# Patient Record
Sex: MALE | Race: WHITE | Employment: FULL TIME | URBAN - METROPOLITAN AREA
[De-identification: names, ages, dates, MRNs, and addresses within clinical notes are randomized per-mention and may not be internally consistent; named-entity substitution may affect disease eponyms.]

---

## 2017-07-07 ENCOUNTER — OFFICE VISIT (OUTPATIENT)
Dept: FAMILY MEDICINE CLINIC | Age: 52
End: 2017-07-07

## 2017-07-07 VITALS
HEIGHT: 76 IN | SYSTOLIC BLOOD PRESSURE: 142 MMHG | DIASTOLIC BLOOD PRESSURE: 88 MMHG | BODY MASS INDEX: 36.36 KG/M2 | HEART RATE: 72 BPM | WEIGHT: 298.6 LBS | RESPIRATION RATE: 18 BRPM

## 2017-07-07 DIAGNOSIS — Z12.5 PROSTATE CANCER SCREENING: ICD-10-CM

## 2017-07-07 DIAGNOSIS — Z12.11 COLON CANCER SCREENING: ICD-10-CM

## 2017-07-07 DIAGNOSIS — Z00.00 ANNUAL PHYSICAL EXAM: Primary | ICD-10-CM

## 2017-07-07 PROCEDURE — 3017F COLORECTAL CA SCREEN DOC REV: CPT | Performed by: FAMILY MEDICINE

## 2017-07-07 PROCEDURE — G8427 DOCREV CUR MEDS BY ELIG CLIN: HCPCS | Performed by: FAMILY MEDICINE

## 2017-07-07 PROCEDURE — 1036F TOBACCO NON-USER: CPT | Performed by: FAMILY MEDICINE

## 2017-07-07 PROCEDURE — 99396 PREV VISIT EST AGE 40-64: CPT | Performed by: FAMILY MEDICINE

## 2017-07-07 PROCEDURE — G8417 CALC BMI ABV UP PARAM F/U: HCPCS | Performed by: FAMILY MEDICINE

## 2017-07-07 RX ORDER — MOMETASONE FUROATE 1 MG/G
CREAM TOPICAL
Qty: 30 G | Refills: 1 | Status: SHIPPED | OUTPATIENT
Start: 2017-07-07

## 2017-07-07 ASSESSMENT — PATIENT HEALTH QUESTIONNAIRE - PHQ9
1. LITTLE INTEREST OR PLEASURE IN DOING THINGS: 0
SUM OF ALL RESPONSES TO PHQ9 QUESTIONS 1 & 2: 0
SUM OF ALL RESPONSES TO PHQ QUESTIONS 1-9: 0
2. FEELING DOWN, DEPRESSED OR HOPELESS: 0

## 2017-07-08 DIAGNOSIS — Z00.00 ANNUAL PHYSICAL EXAM: ICD-10-CM

## 2017-07-08 DIAGNOSIS — Z12.5 PROSTATE CANCER SCREENING: ICD-10-CM

## 2017-07-08 LAB
ALBUMIN SERPL-MCNC: 4.2 G/DL (ref 3.9–4.9)
ALP BLD-CCNC: 43 U/L (ref 35–104)
ALT SERPL-CCNC: 24 U/L (ref 0–41)
ANION GAP SERPL CALCULATED.3IONS-SCNC: 12 MEQ/L (ref 7–13)
AST SERPL-CCNC: 26 U/L (ref 0–40)
BASOPHILS ABSOLUTE: 0.1 K/UL (ref 0–0.2)
BASOPHILS RELATIVE PERCENT: 1.1 %
BILIRUB SERPL-MCNC: 0.8 MG/DL (ref 0–1.2)
BUN BLDV-MCNC: 14 MG/DL (ref 6–20)
CALCIUM SERPL-MCNC: 8.8 MG/DL (ref 8.6–10.2)
CHLORIDE BLD-SCNC: 104 MEQ/L (ref 98–107)
CHOLESTEROL, TOTAL: 154 MG/DL (ref 0–199)
CO2: 22 MEQ/L (ref 22–29)
CREAT SERPL-MCNC: 1.06 MG/DL (ref 0.7–1.2)
EOSINOPHILS ABSOLUTE: 0.1 K/UL (ref 0–0.7)
EOSINOPHILS RELATIVE PERCENT: 1.8 %
GFR AFRICAN AMERICAN: >60
GFR NON-AFRICAN AMERICAN: >60
GLOBULIN: 2.3 G/DL (ref 2.3–3.5)
GLUCOSE BLD-MCNC: 89 MG/DL (ref 74–109)
HBA1C MFR BLD: 4.5 % (ref 4.8–5.9)
HCT VFR BLD CALC: 44.3 % (ref 42–52)
HDLC SERPL-MCNC: 52 MG/DL (ref 40–59)
HEMOGLOBIN: 15.5 G/DL (ref 14–18)
LDL CHOLESTEROL CALCULATED: 84 MG/DL (ref 0–129)
LYMPHOCYTES ABSOLUTE: 1.4 K/UL (ref 1–4.8)
LYMPHOCYTES RELATIVE PERCENT: 30.1 %
MCH RBC QN AUTO: 30.1 PG (ref 27–31.3)
MCHC RBC AUTO-ENTMCNC: 35.1 % (ref 33–37)
MCV RBC AUTO: 85.8 FL (ref 80–100)
MONOCYTES ABSOLUTE: 0.4 K/UL (ref 0.2–0.8)
MONOCYTES RELATIVE PERCENT: 9.1 %
NEUTROPHILS ABSOLUTE: 2.7 K/UL (ref 1.4–6.5)
NEUTROPHILS RELATIVE PERCENT: 57.9 %
PDW BLD-RTO: 14.2 % (ref 11.5–14.5)
PLATELET # BLD: 129 K/UL (ref 130–400)
POTASSIUM SERPL-SCNC: 4.1 MEQ/L (ref 3.5–5.1)
PROSTATE SPECIFIC ANTIGEN: 0.53 NG/ML (ref 0–3.89)
RBC # BLD: 5.16 M/UL (ref 4.7–6.1)
SODIUM BLD-SCNC: 138 MEQ/L (ref 132–144)
TOTAL PROTEIN: 6.5 G/DL (ref 6.4–8.1)
TRIGL SERPL-MCNC: 89 MG/DL (ref 0–200)
WBC # BLD: 4.7 K/UL (ref 4.8–10.8)

## 2017-08-04 ENCOUNTER — ANESTHESIA (OUTPATIENT)
Dept: ENDOSCOPY | Age: 52
End: 2017-08-04
Payer: COMMERCIAL

## 2017-08-04 ENCOUNTER — ANESTHESIA EVENT (OUTPATIENT)
Dept: ENDOSCOPY | Age: 52
End: 2017-08-04
Payer: COMMERCIAL

## 2017-08-04 ENCOUNTER — HOSPITAL ENCOUNTER (OUTPATIENT)
Age: 52
Setting detail: OUTPATIENT SURGERY
Discharge: HOME | End: 2017-08-04
Attending: SPECIALIST | Admitting: SPECIALIST
Payer: COMMERCIAL

## 2017-08-04 VITALS
BODY MASS INDEX: 34.7 KG/M2 | TEMPERATURE: 97.4 F | SYSTOLIC BLOOD PRESSURE: 98 MMHG | WEIGHT: 285 LBS | HEIGHT: 76 IN | HEART RATE: 52 BPM | OXYGEN SATURATION: 97 % | RESPIRATION RATE: 18 BRPM | DIASTOLIC BLOOD PRESSURE: 64 MMHG

## 2017-08-04 VITALS — DIASTOLIC BLOOD PRESSURE: 52 MMHG | SYSTOLIC BLOOD PRESSURE: 110 MMHG | OXYGEN SATURATION: 96 %

## 2017-08-04 PROCEDURE — 3609027000 HC COLONOSCOPY: Performed by: SPECIALIST

## 2017-08-04 PROCEDURE — 3700000001 HC ADD 15 MINUTES (ANESTHESIA): Performed by: SPECIALIST

## 2017-08-04 PROCEDURE — 6360000002 HC RX W HCPCS: Performed by: NURSE ANESTHETIST, CERTIFIED REGISTERED

## 2017-08-04 PROCEDURE — 2580000003 HC RX 258: Performed by: SPECIALIST

## 2017-08-04 PROCEDURE — 7100000011 HC PHASE II RECOVERY - ADDTL 15 MIN: Performed by: SPECIALIST

## 2017-08-04 PROCEDURE — 88305 TISSUE EXAM BY PATHOLOGIST: CPT

## 2017-08-04 PROCEDURE — 2500000003 HC RX 250 WO HCPCS: Performed by: NURSE ANESTHETIST, CERTIFIED REGISTERED

## 2017-08-04 PROCEDURE — 3700000000 HC ANESTHESIA ATTENDED CARE: Performed by: SPECIALIST

## 2017-08-04 PROCEDURE — 7100000010 HC PHASE II RECOVERY - FIRST 15 MIN: Performed by: SPECIALIST

## 2017-08-04 RX ORDER — SODIUM CHLORIDE 0.9 % (FLUSH) 0.9 %
10 SYRINGE (ML) INJECTION EVERY 12 HOURS SCHEDULED
Status: DISCONTINUED | OUTPATIENT
Start: 2017-08-04 | End: 2017-08-04 | Stop reason: HOSPADM

## 2017-08-04 RX ORDER — SODIUM CHLORIDE 0.9 % (FLUSH) 0.9 %
10 SYRINGE (ML) INJECTION PRN
Status: DISCONTINUED | OUTPATIENT
Start: 2017-08-04 | End: 2017-08-04 | Stop reason: HOSPADM

## 2017-08-04 RX ORDER — ONDANSETRON 2 MG/ML
4 INJECTION INTRAMUSCULAR; INTRAVENOUS
Status: DISCONTINUED | OUTPATIENT
Start: 2017-08-04 | End: 2017-08-04 | Stop reason: HOSPADM

## 2017-08-04 RX ORDER — SODIUM CHLORIDE 9 MG/ML
INJECTION, SOLUTION INTRAVENOUS CONTINUOUS
Status: DISCONTINUED | OUTPATIENT
Start: 2017-08-04 | End: 2017-08-04 | Stop reason: HOSPADM

## 2017-08-04 RX ORDER — LIDOCAINE HYDROCHLORIDE 10 MG/ML
INJECTION, SOLUTION INFILTRATION; PERINEURAL PRN
Status: DISCONTINUED | OUTPATIENT
Start: 2017-08-04 | End: 2017-08-04 | Stop reason: SDUPTHER

## 2017-08-04 RX ORDER — PROPOFOL 10 MG/ML
INJECTION, EMULSION INTRAVENOUS PRN
Status: DISCONTINUED | OUTPATIENT
Start: 2017-08-04 | End: 2017-08-04 | Stop reason: SDUPTHER

## 2017-08-04 RX ORDER — LIDOCAINE HYDROCHLORIDE 10 MG/ML
1 INJECTION, SOLUTION EPIDURAL; INFILTRATION; INTRACAUDAL; PERINEURAL
Status: DISCONTINUED | OUTPATIENT
Start: 2017-08-04 | End: 2017-08-04 | Stop reason: HOSPADM

## 2017-08-04 RX ADMIN — PROPOFOL 50 MG: 10 INJECTION, EMULSION INTRAVENOUS at 09:39

## 2017-08-04 RX ADMIN — PROPOFOL 30 MG: 10 INJECTION, EMULSION INTRAVENOUS at 09:51

## 2017-08-04 RX ADMIN — PROPOFOL 40 MG: 10 INJECTION, EMULSION INTRAVENOUS at 09:43

## 2017-08-04 RX ADMIN — PROPOFOL 30 MG: 10 INJECTION, EMULSION INTRAVENOUS at 09:53

## 2017-08-04 RX ADMIN — PROPOFOL 50 MG: 10 INJECTION, EMULSION INTRAVENOUS at 09:37

## 2017-08-04 RX ADMIN — PROPOFOL 40 MG: 10 INJECTION, EMULSION INTRAVENOUS at 09:45

## 2017-08-04 RX ADMIN — PROPOFOL 50 MG: 10 INJECTION, EMULSION INTRAVENOUS at 09:55

## 2017-08-04 RX ADMIN — SODIUM CHLORIDE: 9 INJECTION, SOLUTION INTRAVENOUS at 09:30

## 2017-08-04 RX ADMIN — PROPOFOL 30 MG: 10 INJECTION, EMULSION INTRAVENOUS at 09:47

## 2017-08-04 RX ADMIN — LIDOCAINE HYDROCHLORIDE 20 MG: 10 INJECTION, SOLUTION INFILTRATION; PERINEURAL at 09:37

## 2017-08-04 RX ADMIN — PROPOFOL 50 MG: 10 INJECTION, EMULSION INTRAVENOUS at 09:41

## 2017-08-04 RX ADMIN — PROPOFOL 50 MG: 10 INJECTION, EMULSION INTRAVENOUS at 09:57

## 2017-08-04 RX ADMIN — PROPOFOL 30 MG: 10 INJECTION, EMULSION INTRAVENOUS at 09:49

## 2019-03-14 ENCOUNTER — TELEPHONE (OUTPATIENT)
Dept: FAMILY MEDICINE CLINIC | Age: 54
End: 2019-03-14

## 2021-10-21 ENCOUNTER — TELEPHONE (OUTPATIENT)
Dept: INFECTIOUS DISEASES | Age: 56
End: 2021-10-21

## 2021-10-21 NOTE — TELEPHONE ENCOUNTER
Patient called concerning his Referral to I.D. Provided an appt for Mon. 10/25/21. Patient asking if the Physician actually Reviewed his referral, and if it is necessary to take a day off for this visit? I advised I could not Confirm this. I will check with ID staff for further recommendations.

## 2021-11-08 ENCOUNTER — OFFICE VISIT (OUTPATIENT)
Dept: INFECTIOUS DISEASES | Age: 56
End: 2021-11-08
Payer: COMMERCIAL

## 2021-11-08 VITALS — TEMPERATURE: 98.2 F | HEART RATE: 80 BPM | SYSTOLIC BLOOD PRESSURE: 148 MMHG | DIASTOLIC BLOOD PRESSURE: 93 MMHG

## 2021-11-08 DIAGNOSIS — L02.93 RECURRENT BOILS: Primary | ICD-10-CM

## 2021-11-08 DIAGNOSIS — Z22.321 STAPHYLOCOCCUS AUREUS CARRIER: ICD-10-CM

## 2021-11-08 PROCEDURE — 99204 OFFICE O/P NEW MOD 45 MIN: CPT | Performed by: INTERNAL MEDICINE

## 2021-11-08 RX ORDER — CHLORHEXIDINE GLUCONATE 213 G/1000ML
SOLUTION TOPICAL
Qty: 946 ML | Refills: 5 | Status: SHIPPED | OUTPATIENT
Start: 2021-11-08 | End: 2021-11-22

## 2021-11-08 RX ORDER — FLUCONAZOLE 150 MG/1
150 TABLET ORAL ONCE
COMMUNITY

## 2021-11-08 ASSESSMENT — ENCOUNTER SYMPTOMS
GASTROINTESTINAL NEGATIVE: 1
RESPIRATORY NEGATIVE: 1
EYES NEGATIVE: 1

## 2021-11-08 NOTE — PROGRESS NOTES
Infectious Disease     Patient Name: Linda Frey  Date: 11/8/2021  YOB: 1965  Medical Record Number: [id number]          Staph aureus boils    Patient has confirmed culture and for MSSA from the skin 2/15/2021    Patient was referred to ID for MRSA    Patient has been treated with doxycycline and's Bactrim and the pain    Review of Systems   Constitutional: Negative. HENT: Negative. Eyes: Negative. Respiratory: Negative. Cardiovascular: Negative. Gastrointestinal: Negative. Genitourinary: Negative. Musculoskeletal: Negative. Skin:        Patient has chronic eczema of the hands ongoing for the past 48   Neurological: Negative. Review of Systems: All 14 review of systems negative other than as stated above    Social History     Tobacco Use    Smoking status: Never Smoker    Smokeless tobacco: Not on file    Tobacco comment: Never used any. Substance Use Topics    Alcohol use: No    Drug use: No       Past medical history listed as   chronic fatigue   eczema probably secondary to go   cellulitis      When asked about chronic fatigue patient states he has never been diagnosed with chronic fatigue syndrome      Past Surgical History:   Procedure Laterality Date    [place] COLON CA SCRN NOT  [address]  IND N/A 8/4/2017    COLONOSCOPY performed by Zora Bryan MD at [address] [place] [address]         Current Outpatient Medications on File Prior to Visit   Medication Sig Dispense Refill    fluconazole (DIFLUCAN) 150 MG tablet Take 150 mg by mouth once Weekly Q mondays      aspirin 81 MG tablet Take 81 mg by mouth daily (Patient not taking: Reported on 11/8/2021)      mometasone (ELOCON) 0.1 % cream Apply topically daily. (Patient not taking: Reported on 11/8/2021) 30 g 1    Multiple Vitamin (MULTI-VITAMIN PO) Take  by mouth. (Patient not taking: Reported on 11/8/2021)       No current facility-administered medications on file prior to visit.        No Known Allergies      Family History   Problem Relation Age of Onset    High Blood Pressure Mother     Diabetes Father     High Blood Pressure Father     High Cholesterol Father          Physical Exam:      Physical Exam  Constitutional:       Appearance: Normal appearance. He is obese. Neck:      Vascular: No carotid bruit. Cardiovascular:      Heart sounds: Normal heart sounds. No murmur heard. Pulmonary:      Effort: Pulmonary effort is normal. No respiratory distress. Breath sounds: Normal breath sounds. No stridor. No wheezing, rhonchi or rales. Chest:      Chest wall: No tenderness. Abdominal:      General: Abdomen is flat. Bowel sounds are normal. There is no distension. Palpations: Abdomen is soft. There is no mass. Tenderness: There is no abdominal tenderness. There is no guarding or rebound. Hernia: No hernia is present. Musculoskeletal:         General: No swelling or tenderness. Cervical back: No rigidity or tenderness. Lymphadenopathy:      Cervical: No cervical adenopathy. Skin:         Neurological:      Mental Status: He is alert. Blood pressure (!) 148/93, pulse 80, temperature 98.2 °F (36.8 °C), temperature source Infrared.       .   Lab Results   Component Value Date    WBC 4.7 (L) 07/08/2017    HGB 15.5 07/08/2017    HCT 44.3 07/08/2017    MCV 85.8 07/08/2017     (L) 07/08/2017     Lab Results   Component Value Date     07/08/2017    K 4.1 07/08/2017     07/08/2017    CO2 22 07/08/2017    BUN 14 07/08/2017    CREATININE 1.06 07/08/2017    GLUCOSE 89 07/08/2017    GLUCOSE 88 10/04/2011    CALCIUM 8.8 07/08/2017                ASSESSMENT:  Patient Active Problem List   Diagnosis    Sebaceous cyst         PLAN:  Staph aureus boils  Chronic carrier Staph aureus    Hibiclens solution with with once a week

## 2023-03-24 DIAGNOSIS — L08.9 RECURRENT INFECTION OF SKIN: Primary | ICD-10-CM

## 2023-03-24 PROBLEM — L30.9 HAND ECZEMA: Status: ACTIVE | Noted: 2023-03-24

## 2023-03-24 PROBLEM — S00.03XA HEMATOMA OF SCALP: Status: ACTIVE | Noted: 2023-03-24

## 2023-03-24 PROBLEM — L29.8 PRURITIC ERYTHEMATOUS RASH: Status: ACTIVE | Noted: 2023-03-24

## 2023-03-24 PROBLEM — G93.32 CHRONIC FATIGUE AND IMMUNE DYSFUNCTION SYNDROME (MULTI): Status: ACTIVE | Noted: 2023-03-24

## 2023-03-24 PROBLEM — B35.4 TINEA CORPORIS: Status: ACTIVE | Noted: 2023-03-24

## 2023-03-24 PROBLEM — L29.89 PRURITIC ERYTHEMATOUS RASH: Status: ACTIVE | Noted: 2023-03-24

## 2023-03-24 PROBLEM — D89.89 CHRONIC FATIGUE AND IMMUNE DYSFUNCTION SYNDROME (MULTI): Status: ACTIVE | Noted: 2023-03-24

## 2023-03-24 PROBLEM — T14.8XXA POSTTRAUMATIC WOUND INFECTION: Status: ACTIVE | Noted: 2023-03-24

## 2023-03-24 PROBLEM — A49.02 MRSA (METHICILLIN RESISTANT STAPHYLOCOCCUS AUREUS): Status: ACTIVE | Noted: 2023-03-24

## 2023-03-24 RX ORDER — SULFAMETHOXAZOLE AND TRIMETHOPRIM 800; 160 MG/1; MG/1
1 TABLET ORAL DAILY
Qty: 30 TABLET | Refills: 1 | Status: SHIPPED | OUTPATIENT
Start: 2023-03-24

## 2023-03-24 RX ORDER — PREDNISONE 20 MG/1
20 TABLET ORAL DAILY
Qty: 30 TABLET | Refills: 3 | Status: SHIPPED | OUTPATIENT
Start: 2023-03-24

## 2023-03-24 RX ORDER — SULFAMETHOXAZOLE AND TRIMETHOPRIM 800; 160 MG/1; MG/1
1 TABLET ORAL DAILY
COMMUNITY
Start: 2022-08-19 | End: 2023-03-24 | Stop reason: SDUPTHER

## 2023-03-24 RX ORDER — PREDNISONE 20 MG/1
TABLET ORAL
COMMUNITY
Start: 2020-11-17 | End: 2023-03-24 | Stop reason: SDUPTHER

## 2023-03-24 NOTE — TELEPHONE ENCOUNTER
Cedrickjavi's wife at checkout stated that Neville needs his Prednisone and Trimethoprim/sulfamethoxazole, to be sent to [facility] in [place].    Please advise.

## 2023-03-24 NOTE — TELEPHONE ENCOUNTER
Medication has been pended to provider for approval.     Please review directions, quantity, and diagnosis.

## 2023-05-11 PROBLEM — G93.32 CHRONIC FATIGUE AND IMMUNE DYSFUNCTION SYNDROME (MULTI): Status: RESOLVED | Noted: 2023-03-24 | Resolved: 2023-05-11

## 2023-05-11 PROBLEM — D89.89 CHRONIC FATIGUE AND IMMUNE DYSFUNCTION SYNDROME (MULTI): Status: RESOLVED | Noted: 2023-03-24 | Resolved: 2023-05-11

## 2023-05-11 RX ORDER — DESOXIMETASONE 2.5 MG/G
OINTMENT TOPICAL 2 TIMES DAILY
COMMUNITY
Start: 2022-02-21

## 2023-05-11 RX ORDER — FLUCONAZOLE 150 MG/1
TABLET ORAL WEEKLY
COMMUNITY
Start: 2021-10-25

## 2023-05-11 RX ORDER — CLOTRIMAZOLE AND BETAMETHASONE DIPROPIONATE 10; .64 MG/G; MG/G
CREAM TOPICAL 2 TIMES DAILY
COMMUNITY
Start: 2021-10-27

## 2023-05-15 ENCOUNTER — OFFICE VISIT (OUTPATIENT)
Dept: PRIMARY CARE | Facility: CLINIC | Age: 58
End: 2023-05-15
Payer: COMMERCIAL

## 2023-05-15 VITALS
TEMPERATURE: 97.6 F | DIASTOLIC BLOOD PRESSURE: 85 MMHG | RESPIRATION RATE: 16 BRPM | HEIGHT: 76 IN | BODY MASS INDEX: 38.36 KG/M2 | WEIGHT: 315 LBS | OXYGEN SATURATION: 93 % | HEART RATE: 65 BPM | SYSTOLIC BLOOD PRESSURE: 134 MMHG

## 2023-05-15 DIAGNOSIS — E66.01 CLASS 2 SEVERE OBESITY DUE TO EXCESS CALORIES WITH SERIOUS COMORBIDITY AND BODY MASS INDEX (BMI) OF 38.0 TO 38.9 IN ADULT (MULTI): ICD-10-CM

## 2023-05-15 DIAGNOSIS — L30.9 HAND ECZEMA: Primary | ICD-10-CM

## 2023-05-15 DIAGNOSIS — L08.9 RECURRENT INFECTION OF SKIN: ICD-10-CM

## 2023-05-15 DIAGNOSIS — E78.2 MIXED HYPERLIPIDEMIA: ICD-10-CM

## 2023-05-15 DIAGNOSIS — Z12.5 PROSTATE CANCER SCREENING: ICD-10-CM

## 2023-05-15 PROBLEM — E66.812 CLASS 2 SEVERE OBESITY DUE TO EXCESS CALORIES WITH SERIOUS COMORBIDITY AND BODY MASS INDEX (BMI) OF 38.0 TO 38.9 IN ADULT: Status: ACTIVE | Noted: 2023-05-15

## 2023-05-15 PROCEDURE — 3008F BODY MASS INDEX DOCD: CPT | Performed by: FAMILY MEDICINE

## 2023-05-15 PROCEDURE — 96372 THER/PROPH/DIAG INJ SC/IM: CPT | Performed by: FAMILY MEDICINE

## 2023-05-15 PROCEDURE — 1036F TOBACCO NON-USER: CPT | Performed by: FAMILY MEDICINE

## 2023-05-15 PROCEDURE — 99214 OFFICE O/P EST MOD 30 MIN: CPT | Performed by: FAMILY MEDICINE

## 2023-05-15 RX ORDER — TRIAMCINOLONE ACETONIDE 40 MG/ML
80 INJECTION, SUSPENSION INTRA-ARTICULAR; INTRAMUSCULAR ONCE
Status: COMPLETED | OUTPATIENT
Start: 2023-05-15 | End: 2023-05-15

## 2023-05-15 RX ADMIN — TRIAMCINOLONE ACETONIDE 80 MG: 40 INJECTION, SUSPENSION INTRA-ARTICULAR; INTRAMUSCULAR at 15:43

## 2023-05-15 ASSESSMENT — ENCOUNTER SYMPTOMS
SORE THROAT: 0
ARTHRALGIAS: 0
BLOOD IN STOOL: 0
CONSTIPATION: 0
ACTIVITY CHANGE: 0
NAUSEA: 0
FATIGUE: 0
DIARRHEA: 0
NUMBNESS: 0
HEMATURIA: 0
BACK PAIN: 0
CHEST TIGHTNESS: 0
BRUISES/BLEEDS EASILY: 0
WEAKNESS: 0
ABDOMINAL PAIN: 0
EYE DISCHARGE: 0
ADENOPATHY: 0
MYALGIAS: 0
DYSPHORIC MOOD: 0
NECK PAIN: 0
SHORTNESS OF BREATH: 0
DIFFICULTY URINATING: 0
HEADACHES: 0
VOMITING: 0
DIZZINESS: 0
NERVOUS/ANXIOUS: 0
COUGH: 0

## 2023-05-15 NOTE — PROGRESS NOTES
"Dictation #1  MRN:[id number]  CSN:[id number] Subjective   Patient ID: Trevor Ramirez is a 57 y.o. male who presents for yearly Annual Exam.      Patient c/o constant cracked skin/dry skin on on bilateral hands. He stated it has been an on going issues.       HPI      Ongoing hand eczema  Seen by multiple dermatologist  Would like cortisone injection  Discussed risks and benefits    Recurrent skin infection  Seen by dermatology and infectious disease  No underlying issues reported    Obese  Recommend weight loss    High cholesterol stable  Watch diet    Prostate cancer screening suggested    Stay current with colonoscopy and this is current at this time  Review of Systems   Constitutional:  Negative for activity change and fatigue.   HENT:  Negative for congestion and sore throat.    Eyes:  Negative for discharge.   Respiratory:  Negative for cough, chest tightness and shortness of breath.    Cardiovascular:  Negative for chest pain and leg swelling.   Gastrointestinal:  Negative for abdominal pain, blood in stool, constipation, diarrhea, nausea and vomiting.   Endocrine: Negative for cold intolerance and heat intolerance.   Genitourinary:  Negative for difficulty urinating and hematuria.   Musculoskeletal:  Negative for arthralgias, back pain, gait problem, myalgias and neck pain.   Allergic/Immunologic: Negative for environmental allergies.   Neurological:  Negative for dizziness, syncope, weakness, numbness and headaches.   Hematological:  Negative for adenopathy. Does not bruise/bleed easily.   Psychiatric/Behavioral:  Negative for dysphoric mood. The patient is not nervous/anxious.    All other systems reviewed and are negative.      Objective   /85 (BP Location: Left arm, Patient Position: Sitting)   Pulse 65   Temp 36.4 °C (97.6 °F) (Temporal)   Resp 16   Ht 1.93 m (6' 4\")   Wt 145 kg (320 lb)   SpO2 93%   BMI 38.95 kg/m²    Physical Exam  Vitals and nursing note reviewed.   Constitutional:      "  General: He is not in acute distress.     Appearance: Normal appearance.   HENT:      Head: Normocephalic and atraumatic.      Right Ear: Tympanic membrane, ear canal and external ear normal.      Left Ear: Tympanic membrane, ear canal and external ear normal.      Nose: Nose normal.      Mouth/Throat:      Mouth: Mucous membranes are moist.      Pharynx: Oropharynx is clear. No oropharyngeal exudate or posterior oropharyngeal erythema.   Eyes:      Extraocular Movements: Extraocular movements intact.      Conjunctiva/sclera: Conjunctivae normal.      Pupils: Pupils are equal, round, and reactive to light.   Cardiovascular:      Rate and Rhythm: Normal rate and regular rhythm.      Pulses: Normal pulses.      Heart sounds: Normal heart sounds. No murmur heard.  Pulmonary:      Effort: Pulmonary effort is normal. No respiratory distress.      Breath sounds: Normal breath sounds. No wheezing or rales.   Abdominal:      General: Abdomen is flat. Bowel sounds are normal. There is no distension.      Palpations: Abdomen is soft. There is no mass.      Tenderness: There is no abdominal tenderness.   Musculoskeletal:         General: No swelling or deformity. Normal range of motion.      Cervical back: Normal range of motion and neck supple.      Right lower leg: No edema.      Left lower leg: No edema.   Lymphadenopathy:      Cervical: No cervical adenopathy.   Skin:     General: Skin is warm and dry.      Capillary Refill: Capillary refill takes less than 2 seconds.      Findings: No lesion or rash.      Comments: Positive bilateral hand eczema   Neurological:      General: No focal deficit present.      Mental Status: He is alert and oriented to person, place, and time.      Cranial Nerves: No cranial nerve deficit.      Motor: No weakness.   Psychiatric:         Mood and Affect: Mood normal.         Behavior: Behavior normal.         Thought Content: Thought content normal.         Judgment: Judgment normal.          Assessment/Plan   Problem List Items Addressed This Visit          Musculoskeletal    Hand eczema - Primary    Relevant Medications    triamcinolone acetonide (Kenalog-40) injection 80 mg (Start on 5/15/2023  3:15 PM)    Other Relevant Orders    Follow Up In [facility] - PCP       Endocrine/Metabolic    Class 2 severe obesity due to excess calories with serious comorbidity and body mass index (BMI) of 38.0 to 38.9 in adult (CMS/[facility])       Infectious/Inflammatory    Recurrent infection of skin     Other Visit Diagnoses       Mixed hyperlipidemia        Relevant Orders    CBC and Auto Differential    Comprehensive Metabolic Panel    Lipid Panel    Prostate cancer screening        Relevant Orders    Prostate Specific Antigen            Patient education provided.  Stay current with age appropriate health maintenance as instructed.  Appointment here or ER with new or worsening symptoms'  Keep appropriate follow-up visit.  Stay current with proper immunizations   Declines further dermatology evaluation 6-month recheck  Stay current with blood work as above  Diagnosis reviewed and stable  Follow-up in 1 year and sooner with issues

## 2023-11-13 ENCOUNTER — APPOINTMENT (OUTPATIENT)
Dept: PRIMARY CARE | Facility: CLINIC | Age: 58
End: 2023-11-13
Payer: COMMERCIAL

## 2024-09-12 ENCOUNTER — APPOINTMENT (OUTPATIENT)
Dept: PRIMARY CARE | Facility: CLINIC | Age: 59
End: 2024-09-12
Payer: COMMERCIAL

## 2024-09-23 ENCOUNTER — APPOINTMENT (OUTPATIENT)
Dept: PRIMARY CARE | Facility: CLINIC | Age: 59
End: 2024-09-23
Payer: COMMERCIAL

## 2024-09-23 ENCOUNTER — LAB (OUTPATIENT)
Dept: LAB | Facility: LAB | Age: 59
End: 2024-09-23
Payer: COMMERCIAL

## 2024-09-23 VITALS
SYSTOLIC BLOOD PRESSURE: 136 MMHG | DIASTOLIC BLOOD PRESSURE: 89 MMHG | BODY MASS INDEX: 35.61 KG/M2 | HEIGHT: 76 IN | WEIGHT: 292.4 LBS | HEART RATE: 69 BPM | OXYGEN SATURATION: 97 %

## 2024-09-23 DIAGNOSIS — Z12.5 PROSTATE CANCER SCREENING: ICD-10-CM

## 2024-09-23 DIAGNOSIS — E78.2 MIXED HYPERLIPIDEMIA: Primary | ICD-10-CM

## 2024-09-23 DIAGNOSIS — E66.01 CLASS 2 SEVERE OBESITY DUE TO EXCESS CALORIES WITH SERIOUS COMORBIDITY AND BODY MASS INDEX (BMI) OF 35.0 TO 35.9 IN ADULT: ICD-10-CM

## 2024-09-23 DIAGNOSIS — L30.9 HAND ECZEMA: ICD-10-CM

## 2024-09-23 DIAGNOSIS — E78.2 MIXED HYPERLIPIDEMIA: ICD-10-CM

## 2024-09-23 DIAGNOSIS — Z12.11 SCREENING FOR MALIGNANT NEOPLASM OF COLON: ICD-10-CM

## 2024-09-23 LAB
ALBUMIN SERPL BCP-MCNC: 4.4 G/DL (ref 3.4–5)
ALP SERPL-CCNC: 43 U/L (ref 33–120)
ALT SERPL W P-5'-P-CCNC: 14 U/L (ref 10–52)
ANION GAP SERPL CALC-SCNC: 11 MMOL/L (ref 10–20)
AST SERPL W P-5'-P-CCNC: 18 U/L (ref 9–39)
BASOPHILS # BLD AUTO: 0.03 X10*3/UL (ref 0–0.1)
BASOPHILS NFR BLD AUTO: 0.7 %
BILIRUB SERPL-MCNC: 0.9 MG/DL (ref 0–1.2)
BUN SERPL-MCNC: 14 MG/DL (ref 6–23)
CALCIUM SERPL-MCNC: 9.5 MG/DL (ref 8.6–10.3)
CHLORIDE SERPL-SCNC: 107 MMOL/L (ref 98–107)
CHOLEST SERPL-MCNC: 179 MG/DL (ref 0–199)
CHOLESTEROL/HDL RATIO: 3.1
CO2 SERPL-SCNC: 27 MMOL/L (ref 21–32)
CREAT SERPL-MCNC: 1.22 MG/DL (ref 0.5–1.3)
EGFRCR SERPLBLD CKD-EPI 2021: 68 ML/MIN/1.73M*2
EOSINOPHIL # BLD AUTO: 0.07 X10*3/UL (ref 0–0.7)
EOSINOPHIL NFR BLD AUTO: 1.6 %
ERYTHROCYTE [DISTWIDTH] IN BLOOD BY AUTOMATED COUNT: 13.7 % (ref 11.5–14.5)
GLUCOSE SERPL-MCNC: 92 MG/DL (ref 74–99)
HCT VFR BLD AUTO: 46.6 % (ref 41–52)
HDLC SERPL-MCNC: 57 MG/DL
HGB BLD-MCNC: 16.4 G/DL (ref 13.5–17.5)
IMM GRANULOCYTES # BLD AUTO: 0 X10*3/UL (ref 0–0.7)
IMM GRANULOCYTES NFR BLD AUTO: 0 % (ref 0–0.9)
LDLC SERPL CALC-MCNC: 103 MG/DL
LYMPHOCYTES # BLD AUTO: 1.34 X10*3/UL (ref 1.2–4.8)
LYMPHOCYTES NFR BLD AUTO: 30.4 %
MCH RBC QN AUTO: 30.5 PG (ref 26–34)
MCHC RBC AUTO-ENTMCNC: 35.2 G/DL (ref 32–36)
MCV RBC AUTO: 87 FL (ref 80–100)
MONOCYTES # BLD AUTO: 0.41 X10*3/UL (ref 0.1–1)
MONOCYTES NFR BLD AUTO: 9.3 %
NEUTROPHILS # BLD AUTO: 2.56 X10*3/UL (ref 1.2–7.7)
NEUTROPHILS NFR BLD AUTO: 58 %
NON HDL CHOLESTEROL: 122 MG/DL (ref 0–149)
NRBC BLD-RTO: 0 /100 WBCS (ref 0–0)
PLATELET # BLD AUTO: 154 X10*3/UL (ref 150–450)
POTASSIUM SERPL-SCNC: 4.4 MMOL/L (ref 3.5–5.3)
PROT SERPL-MCNC: 6.8 G/DL (ref 6.4–8.2)
PSA SERPL-MCNC: 1.26 NG/ML
RBC # BLD AUTO: 5.38 X10*6/UL (ref 4.5–5.9)
SODIUM SERPL-SCNC: 141 MMOL/L (ref 136–145)
TRIGL SERPL-MCNC: 94 MG/DL (ref 0–149)
VLDL: 19 MG/DL (ref 0–40)
WBC # BLD AUTO: 4.4 X10*3/UL (ref 4.4–11.3)

## 2024-09-23 PROCEDURE — 80053 COMPREHEN METABOLIC PANEL: CPT

## 2024-09-23 PROCEDURE — 1036F TOBACCO NON-USER: CPT | Performed by: FAMILY MEDICINE

## 2024-09-23 PROCEDURE — 85025 COMPLETE CBC W/AUTO DIFF WBC: CPT

## 2024-09-23 PROCEDURE — 80061 LIPID PANEL: CPT

## 2024-09-23 PROCEDURE — 3008F BODY MASS INDEX DOCD: CPT | Performed by: FAMILY MEDICINE

## 2024-09-23 PROCEDURE — 99214 OFFICE O/P EST MOD 30 MIN: CPT | Performed by: FAMILY MEDICINE

## 2024-09-23 PROCEDURE — 84153 ASSAY OF PSA TOTAL: CPT

## 2024-09-23 PROCEDURE — 36415 COLL VENOUS BLD VENIPUNCTURE: CPT

## 2024-09-23 RX ORDER — DUPILUMAB 300 MG/2ML
300 INJECTION, SOLUTION SUBCUTANEOUS
COMMUNITY
Start: 2024-09-10

## 2024-09-23 ASSESSMENT — ENCOUNTER SYMPTOMS
CONSTIPATION: 0
MYALGIAS: 0
ACTIVITY CHANGE: 0
EYE DISCHARGE: 0
FATIGUE: 0
DIFFICULTY URINATING: 0
NUMBNESS: 0
DIZZINESS: 0
ABDOMINAL PAIN: 0
NECK PAIN: 0
WEAKNESS: 0
DYSPHORIC MOOD: 0
SHORTNESS OF BREATH: 0
ARTHRALGIAS: 0
HEADACHES: 0
BACK PAIN: 0
BLOOD IN STOOL: 0
CHEST TIGHTNESS: 0
BRUISES/BLEEDS EASILY: 0
NERVOUS/ANXIOUS: 0
SORE THROAT: 0
DIARRHEA: 0
NAUSEA: 0
COUGH: 0
ADENOPATHY: 0
HEMATURIA: 0
VOMITING: 0

## 2024-09-23 NOTE — PROGRESS NOTES
"Subjective   Patient ID: Trevor Ramirez is a 58 y.o. male who presents for Annual Exam.  HPI  High cholesterol stable  Watch diet follow blood work    Hand eczema stable seen by dermatology and Dupixent helpful    Due for colon cancer screening and prostate cancer screening    Patient obese and should lose weight  Has been dieting and exercising and losing weight    Patient here today with spouse   Review of Systems   Constitutional:  Negative for activity change and fatigue.   HENT:  Negative for congestion and sore throat.    Eyes:  Negative for discharge.   Respiratory:  Negative for cough, chest tightness and shortness of breath.    Cardiovascular:  Negative for chest pain and leg swelling.   Gastrointestinal:  Negative for abdominal pain, blood in stool, constipation, diarrhea, nausea and vomiting.   Endocrine: Negative for cold intolerance and heat intolerance.   Genitourinary:  Negative for difficulty urinating and hematuria.   Musculoskeletal:  Negative for arthralgias, back pain, gait problem, myalgias and neck pain.   Allergic/Immunologic: Negative for environmental allergies.   Neurological:  Negative for dizziness, syncope, weakness, numbness and headaches.   Hematological:  Negative for adenopathy. Does not bruise/bleed easily.   Psychiatric/Behavioral:  Negative for dysphoric mood. The patient is not nervous/anxious.    All other systems reviewed and are negative.      Objective   /89 (BP Location: Right arm, BP Cuff Size: Large adult)   Pulse 69   Ht 1.93 m (6' 4\")   Wt 133 kg (292 lb 6.4 oz)   SpO2 97%   BMI 35.59 kg/m²    Physical Exam  Vitals and nursing note reviewed.   Constitutional:       General: He is not in acute distress.     Appearance: Normal appearance. He is obese.   HENT:      Head: Normocephalic and atraumatic.      Right Ear: Tympanic membrane, ear canal and external ear normal.      Left Ear: Tympanic membrane, ear canal and external ear normal.      Nose: Nose " normal.      Mouth/Throat:      Mouth: Mucous membranes are moist.      Pharynx: Oropharynx is clear. No oropharyngeal exudate or posterior oropharyngeal erythema.   Eyes:      Extraocular Movements: Extraocular movements intact.      Conjunctiva/sclera: Conjunctivae normal.      Pupils: Pupils are equal, round, and reactive to light.   Cardiovascular:      Rate and Rhythm: Normal rate and regular rhythm.      Pulses: Normal pulses.      Heart sounds: Normal heart sounds. No murmur heard.  Pulmonary:      Effort: Pulmonary effort is normal. No respiratory distress.      Breath sounds: Normal breath sounds. No wheezing or rales.   Abdominal:      General: Abdomen is flat. Bowel sounds are normal. There is no distension.      Palpations: Abdomen is soft. There is no mass.      Tenderness: There is no abdominal tenderness.   Musculoskeletal:         General: No swelling or deformity. Normal range of motion.      Cervical back: Normal range of motion and neck supple.      Right lower leg: No edema.      Left lower leg: No edema.   Lymphadenopathy:      Cervical: No cervical adenopathy.   Skin:     General: Skin is warm and dry.      Capillary Refill: Capillary refill takes less than 2 seconds.      Findings: No lesion or rash.   Neurological:      General: No focal deficit present.      Mental Status: He is alert and oriented to person, place, and time.      Cranial Nerves: No cranial nerve deficit.      Motor: No weakness.   Psychiatric:         Mood and Affect: Mood normal.         Behavior: Behavior normal.         Thought Content: Thought content normal.         Judgment: Judgment normal.         Assessment/Plan   Problem List Items Addressed This Visit       Hand eczema    Class 2 severe obesity due to excess calories with serious comorbidity and body mass index (BMI) of 35.0 to 35.9 in adult (Multi)    Mixed hyperlipidemia - Primary    Relevant Orders    Comprehensive Metabolic Panel    CBC and Auto Differential     Lipid Panel     Other Visit Diagnoses       Screening for malignant neoplasm of colon        Relevant Orders    Colonoscopy Screening; Average Risk Patient    Prostate cancer screening        Relevant Orders    Prostate Specific Antigen            Patient education provided.  Stay current with age appropriate health maintenance as instructed.  Appointment here or ER with new or worsening symptoms'  Keep appropriate follow-up visit.  Stay current with proper immunizations   orders as above  Recheck 6 months and as needed  Return sooner with new or worsening symptoms  Discussed at length with patient and spouse

## 2024-09-25 DIAGNOSIS — Z12.11 COLON CANCER SCREENING: ICD-10-CM

## 2024-09-25 RX ORDER — POLYETHYLENE GLYCOL 3350, SODIUM CHLORIDE, SODIUM BICARBONATE, POTASSIUM CHLORIDE 420; 11.2; 5.72; 1.48 G/4L; G/4L; G/4L; G/4L
4000 POWDER, FOR SOLUTION ORAL ONCE
Qty: 4000 ML | Refills: 0 | Status: SHIPPED | OUTPATIENT
Start: 2024-09-25 | End: 2024-09-25

## 2024-11-14 ENCOUNTER — CLINICAL SUPPORT (OUTPATIENT)
Dept: PREADMISSION TESTING | Facility: HOSPITAL | Age: 59
End: 2024-11-14
Payer: COMMERCIAL

## 2024-11-14 VITALS — WEIGHT: 289.9 LBS | BODY MASS INDEX: 35.3 KG/M2 | HEIGHT: 76 IN

## 2024-11-14 NOTE — PREPROCEDURE INSTRUCTIONS

## 2024-11-22 ENCOUNTER — PREP FOR PROCEDURE (OUTPATIENT)
Dept: SURGERY | Facility: CLINIC | Age: 59
End: 2024-11-22
Payer: COMMERCIAL

## 2024-11-24 ENCOUNTER — ANESTHESIA EVENT (OUTPATIENT)
Dept: GASTROENTEROLOGY | Facility: HOSPITAL | Age: 59
End: 2024-11-24
Payer: COMMERCIAL

## 2024-11-24 SDOH — HEALTH STABILITY: MENTAL HEALTH: CURRENT SMOKER: 0

## 2024-11-25 ENCOUNTER — HOSPITAL ENCOUNTER (OUTPATIENT)
Dept: GASTROENTEROLOGY | Facility: HOSPITAL | Age: 59
Discharge: HOME | End: 2024-11-25
Payer: COMMERCIAL

## 2024-11-25 ENCOUNTER — ANESTHESIA (OUTPATIENT)
Dept: GASTROENTEROLOGY | Facility: HOSPITAL | Age: 59
End: 2024-11-25
Payer: COMMERCIAL

## 2024-11-25 VITALS
DIASTOLIC BLOOD PRESSURE: 83 MMHG | OXYGEN SATURATION: 97 % | RESPIRATION RATE: 18 BRPM | SYSTOLIC BLOOD PRESSURE: 137 MMHG | TEMPERATURE: 96.8 F | BODY MASS INDEX: 35.97 KG/M2 | HEIGHT: 76 IN | WEIGHT: 295.42 LBS | HEART RATE: 78 BPM

## 2024-11-25 DIAGNOSIS — Z12.11 SCREENING FOR MALIGNANT NEOPLASM OF COLON: ICD-10-CM

## 2024-11-25 PROCEDURE — 45380 COLONOSCOPY AND BIOPSY: CPT | Performed by: SURGERY

## 2024-11-25 PROCEDURE — 7100000010 HC PHASE TWO TIME - EACH INCREMENTAL 1 MINUTE

## 2024-11-25 PROCEDURE — 7100000009 HC PHASE TWO TIME - INITIAL BASE CHARGE

## 2024-11-25 PROCEDURE — 3700000002 HC GENERAL ANESTHESIA TIME - EACH INCREMENTAL 1 MINUTE

## 2024-11-25 PROCEDURE — 2500000004 HC RX 250 GENERAL PHARMACY W/ HCPCS (ALT 636 FOR OP/ED): Performed by: NURSE ANESTHETIST, CERTIFIED REGISTERED

## 2024-11-25 PROCEDURE — 3700000001 HC GENERAL ANESTHESIA TIME - INITIAL BASE CHARGE

## 2024-11-25 RX ORDER — LIDOCAINE HYDROCHLORIDE 20 MG/ML
INJECTION, SOLUTION EPIDURAL; INFILTRATION; INTRACAUDAL; PERINEURAL AS NEEDED
Status: DISCONTINUED | OUTPATIENT
Start: 2024-11-25 | End: 2024-11-25

## 2024-11-25 RX ORDER — PROPOFOL 10 MG/ML
INJECTION, EMULSION INTRAVENOUS AS NEEDED
Status: DISCONTINUED | OUTPATIENT
Start: 2024-11-25 | End: 2024-11-25

## 2024-11-25 ASSESSMENT — PAIN - FUNCTIONAL ASSESSMENT
PAIN_FUNCTIONAL_ASSESSMENT: 0-10

## 2024-11-25 ASSESSMENT — ENCOUNTER SYMPTOMS
ENDOCRINE NEGATIVE: 1
NEUROLOGICAL NEGATIVE: 1
RESPIRATORY NEGATIVE: 1
ALLERGIC/IMMUNOLOGIC NEGATIVE: 1
EYES NEGATIVE: 1
MUSCULOSKELETAL NEGATIVE: 1
GASTROINTESTINAL NEGATIVE: 1
HEMATOLOGIC/LYMPHATIC NEGATIVE: 1
CARDIOVASCULAR NEGATIVE: 1
PSYCHIATRIC NEGATIVE: 1
CONSTITUTIONAL NEGATIVE: 1

## 2024-11-25 ASSESSMENT — PAIN SCALES - GENERAL
PAINLEVEL_OUTOF10: 0 - NO PAIN
PAINLEVEL_OUTOF10: 0 - NO PAIN
PAIN_LEVEL: 1
PAINLEVEL_OUTOF10: 0 - NO PAIN

## 2024-11-25 ASSESSMENT — COLUMBIA-SUICIDE SEVERITY RATING SCALE - C-SSRS
2. HAVE YOU ACTUALLY HAD ANY THOUGHTS OF KILLING YOURSELF?: NO
1. IN THE PAST MONTH, HAVE YOU WISHED YOU WERE DEAD OR WISHED YOU COULD GO TO SLEEP AND NOT WAKE UP?: NO
6. HAVE YOU EVER DONE ANYTHING, STARTED TO DO ANYTHING, OR PREPARED TO DO ANYTHING TO END YOUR LIFE?: NO

## 2024-11-25 NOTE — Clinical Note
Huddle and Timeout completed together with team. Patient wristband and APRIL information verified.  Anesthesia safety check completed

## 2024-11-25 NOTE — H&P
History Of Present Illness  Trevor Ramirez is a 59 y.o. male presenting with screening.     Past Medical History  Past Medical History:   Diagnosis Date    Eczema of hand     Seasonal allergies     Wears reading eyeglasses        Surgical History  Past Surgical History:   Procedure Laterality Date    COLONOSCOPY      TONSILLECTOMY      WISDOM TOOTH EXTRACTION          Social History  He reports that he has never smoked. He has never used smokeless tobacco. He reports that he does not currently use alcohol. He reports that he does not use drugs.    Family History  Family History   Problem Relation Name Age of Onset    Other (SKIN SENSITIVITY) Father          Allergies  Patient has no known allergies.    Review of Systems   Constitutional: Negative.    HENT: Negative.     Eyes: Negative.    Respiratory: Negative.     Cardiovascular: Negative.    Gastrointestinal: Negative.    Endocrine: Negative.    Genitourinary: Negative.    Musculoskeletal: Negative.    Skin: Negative.    Allergic/Immunologic: Negative.    Neurological: Negative.    Hematological: Negative.    Psychiatric/Behavioral: Negative.          Physical Exam  Constitutional:       General: He is not in acute distress.     Appearance: Normal appearance. He is normal weight. He is not toxic-appearing or diaphoretic.   HENT:      Head: Normocephalic and atraumatic.   Eyes:      Conjunctiva/sclera: Conjunctivae normal.      Pupils: Pupils are equal, round, and reactive to light.   Cardiovascular:      Rate and Rhythm: Normal rate and regular rhythm.   Pulmonary:      Effort: Pulmonary effort is normal. No respiratory distress.   Abdominal:      General: Abdomen is flat. There is no distension.      Palpations: Abdomen is soft. There is no mass.      Tenderness: There is no abdominal tenderness.      Hernia: No hernia is present.   Musculoskeletal:         General: No swelling.   Skin:     General: Skin is warm and dry.   Neurological:      General: No focal  "deficit present.      Mental Status: He is alert. Mental status is at baseline.   Psychiatric:         Mood and Affect: Mood normal.         Behavior: Behavior normal.         Thought Content: Thought content normal.         Judgment: Judgment normal.          Last Recorded Vitals  Blood pressure (!) 161/101, pulse 66, temperature 36.3 °C (97.3 °F), temperature source Temporal, resp. rate 18, height 1.93 m (6' 4\"), weight 134 kg (295 lb 6.7 oz), SpO2 96%.    Relevant Results             Assessment/Plan   Assessment & Plan  Screening for malignant neoplasm of colon      colonoscopy       I spent  minutes in the professional and overall care of this patient.      Leigh Patiño MD    "

## 2024-11-25 NOTE — Clinical Note
Patient tolerated procedure well. Appears comfortable with no complaints of pain. VS stable. Arousable prior to transport. Patient transported to [facility] via cart.  Report called   per crna           . Handoff completed

## 2024-11-25 NOTE — ANESTHESIA PREPROCEDURE EVALUATION
Trevor Ramirez is a 59 y.o. male here for:    Colonoscopy  With Leigh Patiño MD  Screening for malignant neoplasm of colon    Relevant Problems   Cardiac   (+) Mixed hyperlipidemia      Endocrine   (+) Class 2 severe obesity due to excess calories with serious comorbidity and body mass index (BMI) of 35.0 to 35.9 in adult      ID   (+) MRSA (methicillin resistant Staphylococcus aureus)   (+) Posttraumatic wound infection   (+) Recurrent infection of skin   (+) Tinea corporis      Skin   (+) Hand eczema   (+) Pruritic erythematous rash       Lab Results   Component Value Date    HGB 16.4 09/23/2024    HCT 46.6 09/23/2024    WBC 4.4 09/23/2024     09/23/2024     09/23/2024    K 4.4 09/23/2024     09/23/2024    CREATININE 1.22 09/23/2024    BUN 14 09/23/2024       Social History     Tobacco Use   Smoking Status Never   Smokeless Tobacco Never       No Known Allergies    Current Outpatient Medications   Medication Instructions    Dupixent Pen 300 mg, subcutaneous, Every 14 days       Past Surgical History:   Procedure Laterality Date    COLONOSCOPY      TONSILLECTOMY      WISDOM TOOTH EXTRACTION         Family History   Problem Relation Name Age of Onset    Other (SKIN SENSITIVITY) Father         NPO Details:  No data recorded    Physical Exam    Airway  Mallampati: II  TM distance: >3 FB  Neck ROM: full     Cardiovascular - normal exam     Dental - normal exam     Pulmonary - normal exam     Abdominal            Anesthesia Plan    History of general anesthesia?: yes  History of complications of general anesthesia?: no    ASA 2     MAC     The patient is not a current smoker.    intravenous induction   Anesthetic plan and risks discussed with patient.    Plan discussed with CRNA.

## 2024-11-25 NOTE — ANESTHESIA POSTPROCEDURE EVALUATION
Patient: Trevor Ramirez    Procedure Summary       Date: 11/25/24 Room / Location: [facility]    Anesthesia Start: 0826 Anesthesia Stop: 0859    Procedure: COLONOSCOPY Diagnosis: Screening for malignant neoplasm of colon    Scheduled Providers: Leigh Patiño MD; Nicolas Fairchild DO; Margaret Shaw RN; Luly Conner Responsible Provider: Nicolas Fairchild DO    Anesthesia Type: MAC ASA Status: 2            Anesthesia Type: MAC    Vitals Value Taken Time   /56 11/25/24 0859   Temp 36 11/25/24 0859   Pulse 60 11/25/24 0859   Resp 16 11/25/24 0859   SpO2 95 11/25/24 0859       Anesthesia Post Evaluation    Patient location during evaluation: bedside  Patient participation: complete - patient participated  Level of consciousness: awake  Pain score: 1  Pain management: adequate  Airway patency: patent  Cardiovascular status: acceptable  Respiratory status: acceptable  Hydration status: acceptable  Postoperative Nausea and Vomiting: none        There were no known notable events for this encounter.

## 2024-11-25 NOTE — DISCHARGE INSTRUCTIONS
Patient Instructions after a Colonoscopy      The anesthetics, sedatives or narcotics which were given to you today will be acting in your body for the next 24 hours, so you might feel a little sleepy or groggy.  This feeling should slowly wear off. Carefully read and follow the instructions.     You received sedation today:  - Do not drive or operate any machinery or power tools of any kind.   - No alcoholic beverages today, not even beer or wine.  - Do not make any important decisions or sign any legal documents.  - No over the counter medications that contain alcohol or that may cause drowsiness.  - Do not make any important decisions or sign any legal documents.    While it is common to experience mild to moderate abdominal distention, gas, or belching after your procedure, if any of these symptoms occur following discharge from the GI Lab or within one week of having your procedure, call the [facility] to be advised whether a visit to your nearest Urgent Care or Emergency Department is indicated.  Take this paper with you if you go.     - If you develop an allergic reaction to the medications that were given during your procedure such as difficulty breathing, rash, hives, severe nausea, vomiting or lightheadedness.  - If you experience chest pain, shortness of breath, severe abdominal pain, fevers and chills.  -If you develop signs and symptoms of bleeding such as blood in your spit, if your stools turn black, tarry, or bloody  - If you have not urinated within 8 hours following your procedure.  - If your IV site becomes painful, red, inflamed, or looks infected.    If you received a biopsy/polypectomy/sphincterotomy the following instructions apply below:    __ Do not use Aspirin containing products, non-steroidal medications or anti-coagulants for one week following your procedure. (Examples of these types of medications are: Advil, Arthrotec, Aleve, Coumadin, Ecotrin, Heparin, Ibuprofen,  Indocin, Motrin, Naprosyn, Nuprin, Plavix, Vioxx, and Voltarin, or their generic forms.  This list is not all-inclusive.  Check with your physician or pharmacist before resuming medications.)   __ Eat a soft diet today.  Avoid foods that are poorly digested for the next 24 hours.  These foods would include: nuts, beans, lettuce, red meats, and fried foods. Start with liquids and advance your diet as tolerated, gradually work up to eating solids.   __ Do not have a Barium Study or Enema for one week.    Your physician recommends the additional following instructions:    -You have a contact number available for emergencies. The signs and symptoms of potential delayed complications were discussed with you. You may return to normal activities tomorrow.  -Resume your previous diet.  -Continue your present medications.   -We are waiting for your pathology results.  -Your physician has recommended a repeat colonoscopy (date to be determined after pending pathology results are reviewed) for surveillance based on pathology results.  -The findings and recommendations have been discussed with you.  -The findings and recommendations were discussed with your family.  - Please see Medication Reconciliation Form for new medication/medications prescribed.       If you experience any problems or have any questions following discharge from the GI Lab, please call:  Before 5p.m.  [phone]  After 5p.m.    [phone]    Nurse Signature                                                                        Date___________________                                                                            Patient/Responsible Party Signature                                        Date___________________High Fiber Diet    About this topic  Dietary fiber helps many illnesses. It can help you if you cannot have a bowel movement or if you have loose stools. Fiber can also lower your risk of diabetes and heart disease. Fiber can help  with weight loss by helping you feel long after meals.  You can find fiber in fruits, vegetables, nuts and seeds, whole grains, and legumes. The fiber is the part of the plant food that your body cannot break down and absorb. It passes through your stomach, small bowel, colon, and out your body.  There are two kinds of fiber: Insoluble and soluble fiber. Insoluble fiber helps you pass foods through your digestive system. Insoluble fiber can help you with hard stools. Soluble fiber draws water in and turns it into a gel-like form making digestion slow down. Both are important.    What will the results be?  A high fiber diet can help you with bowel problems like stools that are too hard or too loose. It can also help prevent hemorrhoids and other colon problems. A high fiber diet can also help control your weight and lower blood sugar and cholesterol levels.  What changes to diet are needed?  The amount of fiber you need is based on your age, gender, and health.  Try to get 20 to 35 grams of fiber in your diet each day. Most people in the [place] only eat 15 grams of fiber daily.  Drink at least 8 cups (1920 mL) of fluid each day.  When is this diet used?  Your doctor may talk with you about this diet if you have belly problems.  Who should use this diet?  Older children, young people, and adults can have this diet.  Who should not use this diet?  Some people should not use this diet. Check with your doctor if you have:  Diverticulitis  Active Crohn's disease  Ulcerative colitis  Bowel inflammation  Certain types of GI surgery  Talk to your child's doctor before starting your child on a high fiber diet.  What foods are good to eat?  To get the most from fiber in your diet, eat a wide variety of high fiber foods. Some examples are:  Vegetables like:  Spinach  Peas  Artichoke  Sweet potatoes with skin  Broccoli  Fruits like:  Raspberries  Blueberries  Blackberries  Apples with skin  Dried fruits  Grains like:  Oat  bran  Barley  Whole wheat products  Wheat bran  Dried beans and nuts like:  Sunflower seeds  Almonds  Black beans  Chickpeas  What problems could happen?  Sudden increase of fiber intake can lead to gas, pain, fullness in your belly, and loose stools. Increase fiber gradually while drinking plenty of fluids.  Do not eat too much fiber. Your body will not take in vitamins and minerals as well if you eat too much fiber.  When do I need to call the doctor?  Health problem is not better or you are feeling worse.  Helpful tips  Start slow as you add more fiber to your diet. This may help prevent gas or cramps.  Try to eat the same amount of fiber each day. Aim to get your fiber from nutritious foods. Supplements do not offer the same benefits as food.  Read food labels with care to learn how much fiber is in the food you are eating.  If possible, do not peel fruits or vegetables before you eat them. Eating the peel gives you more fiber.  Where can I learn more?  Eat Right  https://www.eatright.org/food/vitamins-and-supplements/types-of-vitamins-and-nutrients/easy-ways-to-boost-fiber-in-your-daily-diet  Last Reviewed Date  2021-09-23

## 2024-12-09 LAB
LABORATORY COMMENT REPORT: NORMAL
PATH REPORT.FINAL DX SPEC: NORMAL
PATH REPORT.GROSS SPEC: NORMAL
PATH REPORT.RELEVANT HX SPEC: NORMAL
PATH REPORT.TOTAL CANCER: NORMAL

## 2025-02-14 ASSESSMENT — PROMIS GLOBAL HEALTH SCALE
RATE_QUALITY_OF_LIFE: GOOD
EMOTIONAL_PROBLEMS: NEVER
RATE_MENTAL_HEALTH: VERY GOOD
CARRYOUT_PHYSICAL_ACTIVITIES: COMPLETELY
RATE_AVERAGE_FATIGUE: MILD
RATE_SOCIAL_SATISFACTION: VERY GOOD
CARRYOUT_SOCIAL_ACTIVITIES: VERY GOOD
RATE_PHYSICAL_HEALTH: GOOD
RATE_GENERAL_HEALTH: GOOD
RATE_AVERAGE_PAIN: 0

## 2025-02-18 NOTE — PROGRESS NOTES
"Subjective   Patient ID: Trevor Ramirez is a 59 y.o. male who presents for Follow-up.  HPI  With spouse on phone  Patient obese and wants to lose weight  Patient has not tried non-GLP1 oral medications for weight loss. He would like injectable medicines.  He has been following low calorie low fat diet and exercise program for over 6 months and  is currently exercising for over 150 mins per week with little progress.   We discussed the link risks and benefits with patient and with spouse    High cholesterol stable watch diet follow blood work    Hand dermatitis improved with biologic  Seen by dermatology  Review of Systems   Constitutional:  Negative for activity change and fatigue.   HENT:  Negative for congestion and sore throat.    Eyes:  Negative for discharge.   Respiratory:  Negative for cough, chest tightness and shortness of breath.    Cardiovascular:  Negative for chest pain and leg swelling.   Gastrointestinal:  Negative for abdominal pain, blood in stool, constipation, diarrhea, nausea and vomiting.   Endocrine: Negative for cold intolerance and heat intolerance.   Genitourinary:  Negative for difficulty urinating and hematuria.   Musculoskeletal:  Negative for arthralgias, back pain, gait problem, myalgias and neck pain.   Allergic/Immunologic: Negative for environmental allergies.   Neurological:  Negative for dizziness, syncope, weakness, numbness and headaches.   Hematological:  Negative for adenopathy. Does not bruise/bleed easily.   Psychiatric/Behavioral:  Negative for dysphoric mood. The patient is not nervous/anxious.    All other systems reviewed and are negative.      Objective   /89 (BP Location: Left arm, BP Cuff Size: Large adult)   Pulse 70   Ht 1.93 m (6' 4\")   Wt 145 kg (319 lb 6.4 oz)   SpO2 97%   BMI 38.88 kg/m²    Physical Exam  Vitals and nursing note reviewed.   Constitutional:       General: He is not in acute distress.     Appearance: Normal appearance. He is obese. "   HENT:      Head: Normocephalic and atraumatic.      Right Ear: Tympanic membrane, ear canal and external ear normal.      Left Ear: Tympanic membrane, ear canal and external ear normal.      Nose: Nose normal.      Mouth/Throat:      Mouth: Mucous membranes are moist.      Pharynx: Oropharynx is clear. No oropharyngeal exudate or posterior oropharyngeal erythema.   Eyes:      Extraocular Movements: Extraocular movements intact.      Conjunctiva/sclera: Conjunctivae normal.      Pupils: Pupils are equal, round, and reactive to light.   Cardiovascular:      Rate and Rhythm: Normal rate and regular rhythm.      Pulses: Normal pulses.      Heart sounds: Normal heart sounds. No murmur heard.  Pulmonary:      Effort: Pulmonary effort is normal. No respiratory distress.      Breath sounds: Normal breath sounds. No wheezing or rales.   Abdominal:      General: Abdomen is flat. Bowel sounds are normal. There is no distension.      Palpations: Abdomen is soft. There is no mass.      Tenderness: There is no abdominal tenderness.   Musculoskeletal:         General: No swelling or deformity. Normal range of motion.      Cervical back: Normal range of motion and neck supple.      Right lower leg: No edema.      Left lower leg: No edema.   Lymphadenopathy:      Cervical: No cervical adenopathy.   Skin:     General: Skin is warm and dry.      Capillary Refill: Capillary refill takes less than 2 seconds.      Findings: No lesion or rash.   Neurological:      General: No focal deficit present.      Mental Status: He is alert and oriented to person, place, and time.      Cranial Nerves: No cranial nerve deficit.      Motor: No weakness.   Psychiatric:         Mood and Affect: Mood normal.         Behavior: Behavior normal.         Thought Content: Thought content normal.         Judgment: Judgment normal.         Assessment/Plan   Problem List Items Addressed This Visit       Pruritic erythematous rash    Class 2 severe obesity due  to excess calories with serious comorbidity and body mass index (BMI) of 38.0 to 38.9 in adult    Relevant Medications    semaglutide, weight loss, (Wegovy) 0.25 mg/0.5 mL pen injector    Other Relevant Orders    Follow Up In [facility] - PCP    Mixed hyperlipidemia - Primary       Patient education provided.  Stay current with age appropriate health maintenance as instructed.  Appointment here or ER with new or worsening symptoms'  Keep appropriate follow-up visit.  Stay current with proper immunizations       Weight loss and diet-Patient has been prescribed Wegovy today. He will use the Wegovy with continued lifestyle changes that promote a reduced calorie diet and exercise of at least 150 minutes per week.  Meds as above  Recheck 3 months and as needed  Discussed at length with patient

## 2025-02-21 ENCOUNTER — APPOINTMENT (OUTPATIENT)
Dept: PRIMARY CARE | Facility: CLINIC | Age: 60
End: 2025-02-21
Payer: COMMERCIAL

## 2025-02-21 VITALS
BODY MASS INDEX: 38.36 KG/M2 | OXYGEN SATURATION: 97 % | HEART RATE: 70 BPM | HEIGHT: 76 IN | DIASTOLIC BLOOD PRESSURE: 89 MMHG | WEIGHT: 315 LBS | SYSTOLIC BLOOD PRESSURE: 138 MMHG

## 2025-02-21 DIAGNOSIS — E66.01 CLASS 2 SEVERE OBESITY DUE TO EXCESS CALORIES WITH SERIOUS COMORBIDITY AND BODY MASS INDEX (BMI) OF 38.0 TO 38.9 IN ADULT: ICD-10-CM

## 2025-02-21 DIAGNOSIS — E78.2 MIXED HYPERLIPIDEMIA: Primary | ICD-10-CM

## 2025-02-21 DIAGNOSIS — E66.812 CLASS 2 SEVERE OBESITY DUE TO EXCESS CALORIES WITH SERIOUS COMORBIDITY AND BODY MASS INDEX (BMI) OF 38.0 TO 38.9 IN ADULT: ICD-10-CM

## 2025-02-21 DIAGNOSIS — L29.89 PRURITIC ERYTHEMATOUS RASH: ICD-10-CM

## 2025-02-21 PROBLEM — G93.32 CHRONIC FATIGUE SYNDROME: Status: ACTIVE | Noted: 2025-02-21

## 2025-02-21 PROBLEM — H66.90 OTITIS MEDIA: Status: ACTIVE | Noted: 2025-02-21

## 2025-02-21 PROBLEM — H60.90 OTITIS EXTERNA: Status: ACTIVE | Noted: 2025-02-21

## 2025-02-21 PROBLEM — L28.2 PRURITIC RASH: Status: ACTIVE | Noted: 2025-02-21

## 2025-02-21 PROBLEM — D18.00 ANGIOMA: Status: ACTIVE | Noted: 2018-05-08

## 2025-02-21 PROBLEM — Z28.39 NOT UP TO DATE WITH SCHEDULED IMMUNIZATIONS: Status: ACTIVE | Noted: 2025-02-21

## 2025-02-21 PROBLEM — L08.9 POST-TRAUMATIC WOUND INFECTION: Status: ACTIVE | Noted: 2025-02-21

## 2025-02-21 PROBLEM — E66.9 OBESITY: Status: ACTIVE | Noted: 2025-02-21

## 2025-02-21 PROBLEM — L30.9 ECZEMA OF HAND: Status: ACTIVE | Noted: 2025-02-21

## 2025-02-21 PROBLEM — L03.90 CELLULITIS: Status: ACTIVE | Noted: 2025-02-21

## 2025-02-21 PROBLEM — A49.02 METHICILLIN RESISTANT STAPHYLOCOCCUS AUREUS INFECTION: Status: ACTIVE | Noted: 2025-02-21

## 2025-02-21 PROBLEM — D23.72 DERMATOFIBROMA OF LEFT LOWER LEG: Status: ACTIVE | Noted: 2018-05-08

## 2025-02-21 PROBLEM — T14.8XXA POST-TRAUMATIC WOUND INFECTION: Status: ACTIVE | Noted: 2025-02-21

## 2025-02-21 PROBLEM — L82.1 SEBORRHEIC KERATOSIS: Status: ACTIVE | Noted: 2018-05-08

## 2025-02-21 PROCEDURE — 3008F BODY MASS INDEX DOCD: CPT | Performed by: FAMILY MEDICINE

## 2025-02-21 PROCEDURE — 99214 OFFICE O/P EST MOD 30 MIN: CPT | Performed by: FAMILY MEDICINE

## 2025-02-21 RX ORDER — SEMAGLUTIDE 0.25 MG/.5ML
0.25 INJECTION, SOLUTION SUBCUTANEOUS WEEKLY
Qty: 2 ML | Refills: 2 | Status: SHIPPED | OUTPATIENT
Start: 2025-02-21 | End: 2025-05-10

## 2025-02-21 ASSESSMENT — ENCOUNTER SYMPTOMS
NERVOUS/ANXIOUS: 0
MYALGIAS: 0
FATIGUE: 0
ARTHRALGIAS: 0
NAUSEA: 0
SHORTNESS OF BREATH: 0
BACK PAIN: 0
BRUISES/BLEEDS EASILY: 0
HEADACHES: 0
COUGH: 0
NECK PAIN: 0
HEMATURIA: 0
EYE DISCHARGE: 0
NUMBNESS: 0
CHEST TIGHTNESS: 0
VOMITING: 0
BLOOD IN STOOL: 0
WEAKNESS: 0
ACTIVITY CHANGE: 0
DIZZINESS: 0
SORE THROAT: 0
ABDOMINAL PAIN: 0
DIFFICULTY URINATING: 0
DYSPHORIC MOOD: 0
CONSTIPATION: 0
DIARRHEA: 0
ADENOPATHY: 0

## 2025-02-24 ENCOUNTER — TELEPHONE (OUTPATIENT)
Dept: PRIMARY CARE | Facility: CLINIC | Age: 60
End: 2025-02-24
Payer: COMMERCIAL

## 2025-02-24 NOTE — TELEPHONE ENCOUNTER
We received a request for prior authorization on the patient's Wegovy 0.25 mg  from their pharmacy. Prior authorization was submitted to insurance today. We will await their determination.

## 2025-03-06 ENCOUNTER — CLINICAL SUPPORT (OUTPATIENT)
Dept: PRIMARY CARE | Facility: CLINIC | Age: 60
End: 2025-03-06
Payer: COMMERCIAL

## 2025-03-06 DIAGNOSIS — Z79.899 MEDICATION MANAGEMENT: ICD-10-CM

## 2025-03-06 NOTE — PROGRESS NOTES
Subjective   Patient ID: Trevor Ramirez is a 59 y.o. male who presents for No chief complaint on file..    Patient signed his csa for adipex     HPI     Review of Systems    Objective   There were no vitals taken for this visit.    Physical Exam    Assessment/Plan

## 2025-03-07 DIAGNOSIS — E66.812 CLASS 2 SEVERE OBESITY DUE TO EXCESS CALORIES WITH SERIOUS COMORBIDITY AND BODY MASS INDEX (BMI) OF 38.0 TO 38.9 IN ADULT: Primary | ICD-10-CM

## 2025-03-07 DIAGNOSIS — E66.01 CLASS 2 SEVERE OBESITY DUE TO EXCESS CALORIES WITH SERIOUS COMORBIDITY AND BODY MASS INDEX (BMI) OF 38.0 TO 38.9 IN ADULT: Primary | ICD-10-CM

## 2025-03-07 RX ORDER — PHENTERMINE HYDROCHLORIDE 37.5 MG/1
37.5 CAPSULE ORAL
Qty: 90 CAPSULE | Refills: 0 | Status: SHIPPED | OUTPATIENT
Start: 2025-03-07 | End: 2025-06-05

## 2025-05-21 NOTE — PROGRESS NOTES
"Subjective   Patient ID: Trevor Ramirez is a 59 y.o. male who presents for Follow-up.  HPI  Obese  Would like to continue to lose weight with Adipex  Doing very well and tolerating well    High cholesterol stable watch diet follow blood work  Review of Systems   Constitutional:  Negative for activity change and fatigue.   HENT:  Negative for congestion and sore throat.    Eyes:  Negative for discharge.   Respiratory:  Negative for cough, chest tightness and shortness of breath.    Cardiovascular:  Negative for chest pain and leg swelling.   Gastrointestinal:  Negative for abdominal pain, blood in stool, constipation, diarrhea, nausea and vomiting.   Endocrine: Negative for cold intolerance and heat intolerance.   Genitourinary:  Negative for difficulty urinating and hematuria.   Musculoskeletal:  Negative for arthralgias, back pain, gait problem, myalgias and neck pain.   Allergic/Immunologic: Negative for environmental allergies.   Neurological:  Negative for dizziness, syncope, weakness, numbness and headaches.   Hematological:  Negative for adenopathy. Does not bruise/bleed easily.   Psychiatric/Behavioral:  Negative for dysphoric mood. The patient is not nervous/anxious.    All other systems reviewed and are negative.      Objective   /84 (BP Location: Left arm, BP Cuff Size: Large adult)   Pulse 89   Ht 1.93 m (6' 4\")   Wt 125 kg (275 lb 12.8 oz)   SpO2 97%   BMI 33.57 kg/m²    Physical Exam  Vitals and nursing note reviewed.   Constitutional:       General: He is not in acute distress.     Appearance: Normal appearance. He is obese.   HENT:      Head: Normocephalic and atraumatic.      Right Ear: Tympanic membrane, ear canal and external ear normal.      Left Ear: Tympanic membrane, ear canal and external ear normal.      Nose: Nose normal.      Mouth/Throat:      Mouth: Mucous membranes are moist.      Pharynx: Oropharynx is clear. No oropharyngeal exudate or posterior oropharyngeal erythema. "   Eyes:      Extraocular Movements: Extraocular movements intact.      Conjunctiva/sclera: Conjunctivae normal.      Pupils: Pupils are equal, round, and reactive to light.   Cardiovascular:      Rate and Rhythm: Normal rate and regular rhythm.      Pulses: Normal pulses.      Heart sounds: Normal heart sounds. No murmur heard.  Pulmonary:      Effort: Pulmonary effort is normal. No respiratory distress.      Breath sounds: Normal breath sounds. No wheezing or rales.   Abdominal:      General: Abdomen is flat. Bowel sounds are normal. There is no distension.      Palpations: Abdomen is soft. There is no mass.      Tenderness: There is no abdominal tenderness.   Musculoskeletal:         General: No swelling or deformity. Normal range of motion.      Cervical back: Normal range of motion and neck supple.      Right lower leg: No edema.      Left lower leg: No edema.   Lymphadenopathy:      Cervical: No cervical adenopathy.   Skin:     General: Skin is warm and dry.      Capillary Refill: Capillary refill takes less than 2 seconds.      Findings: No lesion or rash.   Neurological:      General: No focal deficit present.      Mental Status: He is alert and oriented to person, place, and time.      Cranial Nerves: No cranial nerve deficit.      Motor: No weakness.   Psychiatric:         Mood and Affect: Mood normal.         Behavior: Behavior normal.         Thought Content: Thought content normal.         Judgment: Judgment normal.         Assessment/Plan   Problem List Items Addressed This Visit       Class 1 obesity due to excess calories with serious comorbidity and body mass index (BMI) of 33.0 to 33.9 in adult    Relevant Orders    Follow Up In [facility] - PCP    Mixed hyperlipidemia - Primary       Patient education provided.  Stay current with age appropriate health maintenance as instructed.  Appointment here or ER with new or worsening symptoms'  Keep appropriate follow-up visit.  Stay current with  proper immunizations   Weight loss and diet  Continue Adipex  Recheck 3 months and as needed

## 2025-05-23 ENCOUNTER — APPOINTMENT (OUTPATIENT)
Dept: PRIMARY CARE | Facility: CLINIC | Age: 60
End: 2025-05-23
Payer: COMMERCIAL

## 2025-05-23 VITALS
HEIGHT: 76 IN | WEIGHT: 275.8 LBS | OXYGEN SATURATION: 97 % | HEART RATE: 89 BPM | DIASTOLIC BLOOD PRESSURE: 84 MMHG | BODY MASS INDEX: 33.58 KG/M2 | SYSTOLIC BLOOD PRESSURE: 139 MMHG

## 2025-05-23 DIAGNOSIS — E78.2 MIXED HYPERLIPIDEMIA: Primary | ICD-10-CM

## 2025-05-23 DIAGNOSIS — E66.01 CLASS 2 SEVERE OBESITY DUE TO EXCESS CALORIES WITH SERIOUS COMORBIDITY AND BODY MASS INDEX (BMI) OF 38.0 TO 38.9 IN ADULT: ICD-10-CM

## 2025-05-23 DIAGNOSIS — E66.811 CLASS 1 OBESITY DUE TO EXCESS CALORIES WITH SERIOUS COMORBIDITY AND BODY MASS INDEX (BMI) OF 33.0 TO 33.9 IN ADULT: ICD-10-CM

## 2025-05-23 DIAGNOSIS — E66.09 CLASS 1 OBESITY DUE TO EXCESS CALORIES WITH SERIOUS COMORBIDITY AND BODY MASS INDEX (BMI) OF 33.0 TO 33.9 IN ADULT: ICD-10-CM

## 2025-05-23 DIAGNOSIS — E66.812 CLASS 2 SEVERE OBESITY DUE TO EXCESS CALORIES WITH SERIOUS COMORBIDITY AND BODY MASS INDEX (BMI) OF 38.0 TO 38.9 IN ADULT: ICD-10-CM

## 2025-05-23 PROCEDURE — 3008F BODY MASS INDEX DOCD: CPT | Performed by: FAMILY MEDICINE

## 2025-05-23 PROCEDURE — 99213 OFFICE O/P EST LOW 20 MIN: CPT | Performed by: FAMILY MEDICINE

## 2025-05-23 RX ORDER — PHENTERMINE HYDROCHLORIDE 37.5 MG/1
37.5 CAPSULE ORAL
Qty: 90 CAPSULE | Refills: 0 | Status: SHIPPED | OUTPATIENT
Start: 2025-05-23 | End: 2025-05-29 | Stop reason: SDUPTHER

## 2025-05-23 ASSESSMENT — ENCOUNTER SYMPTOMS
BLOOD IN STOOL: 0
DIFFICULTY URINATING: 0
VOMITING: 0
ADENOPATHY: 0
COUGH: 0
NECK PAIN: 0
SHORTNESS OF BREATH: 0
ACTIVITY CHANGE: 0
DYSPHORIC MOOD: 0
ABDOMINAL PAIN: 0
CHEST TIGHTNESS: 0
NUMBNESS: 0
FATIGUE: 0
SORE THROAT: 0
NAUSEA: 0
HEADACHES: 0
DIZZINESS: 0
HEMATURIA: 0
ARTHRALGIAS: 0
BRUISES/BLEEDS EASILY: 0
EYE DISCHARGE: 0
WEAKNESS: 0
NERVOUS/ANXIOUS: 0
BACK PAIN: 0
MYALGIAS: 0
CONSTIPATION: 0
DIARRHEA: 0

## 2025-05-29 DIAGNOSIS — E66.01 CLASS 2 SEVERE OBESITY DUE TO EXCESS CALORIES WITH SERIOUS COMORBIDITY AND BODY MASS INDEX (BMI) OF 38.0 TO 38.9 IN ADULT: ICD-10-CM

## 2025-05-29 DIAGNOSIS — E66.812 CLASS 2 SEVERE OBESITY DUE TO EXCESS CALORIES WITH SERIOUS COMORBIDITY AND BODY MASS INDEX (BMI) OF 38.0 TO 38.9 IN ADULT: ICD-10-CM

## 2025-05-29 RX ORDER — PHENTERMINE HYDROCHLORIDE 37.5 MG/1
37.5 CAPSULE ORAL
Qty: 90 CAPSULE | Refills: 0 | Status: SHIPPED | OUTPATIENT
Start: 2025-05-29 | End: 2025-08-27

## 2025-05-29 RX ORDER — PHENTERMINE HYDROCHLORIDE 37.5 MG/1
37.5 CAPSULE ORAL
Qty: 90 CAPSULE | Refills: 0 | Status: SHIPPED | OUTPATIENT
Start: 2025-05-29 | End: 2025-05-29 | Stop reason: SDUPTHER

## 2025-05-29 NOTE — TELEPHONE ENCOUNTER
[facility] called stating the pt is losing his insurance at the end of this month. He is asking to fill his 90 day of Adipex early. They don't have an issue with this as it will be only a few days early. They need your approval to do so. Do you agree with the pt filling this 8 days early?

## 2025-08-25 ENCOUNTER — APPOINTMENT (OUTPATIENT)
Dept: PRIMARY CARE | Facility: CLINIC | Age: 60
End: 2025-08-25
Payer: COMMERCIAL

## 2025-11-24 ENCOUNTER — APPOINTMENT (OUTPATIENT)
Dept: PRIMARY CARE | Facility: CLINIC | Age: 60
End: 2025-11-24
Payer: MEDICARE